# Patient Record
Sex: MALE | Race: WHITE
[De-identification: names, ages, dates, MRNs, and addresses within clinical notes are randomized per-mention and may not be internally consistent; named-entity substitution may affect disease eponyms.]

---

## 2017-12-29 ENCOUNTER — HOSPITAL ENCOUNTER (OUTPATIENT)
Dept: HOSPITAL 94 - WOUND CARE | Age: 37
Discharge: HOME | End: 2017-12-29
Attending: SURGERY
Payer: MEDICAID

## 2017-12-29 DIAGNOSIS — Z72.89: ICD-10-CM
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
## 2017-12-29 DIAGNOSIS — F17.200: ICD-10-CM

## 2017-12-29 DIAGNOSIS — T81.31XD: Primary | ICD-10-CM

## 2017-12-29 DIAGNOSIS — L98.491: ICD-10-CM

## 2017-12-29 DIAGNOSIS — E66.9: ICD-10-CM

## 2017-12-29 DIAGNOSIS — E03.9: ICD-10-CM

## 2017-12-29 PROCEDURE — 99215 OFFICE O/P EST HI 40 MIN: CPT

## 2018-01-03 ENCOUNTER — HOSPITAL ENCOUNTER (OUTPATIENT)
Dept: HOSPITAL 94 - WOUND CARE | Age: 38
Discharge: HOME | End: 2018-01-03
Attending: SURGERY
Payer: MEDICAID

## 2018-01-03 DIAGNOSIS — E03.9: ICD-10-CM

## 2018-01-03 DIAGNOSIS — Z72.89: ICD-10-CM

## 2018-01-03 DIAGNOSIS — Z89.9: ICD-10-CM

## 2018-01-03 DIAGNOSIS — T81.31XD: Primary | ICD-10-CM

## 2018-01-03 DIAGNOSIS — E66.9: ICD-10-CM

## 2018-01-03 DIAGNOSIS — L98.491: ICD-10-CM
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
## 2018-01-03 DIAGNOSIS — F17.200: ICD-10-CM

## 2018-01-03 PROCEDURE — 99211 OFF/OP EST MAY X REQ PHY/QHP: CPT

## 2018-01-05 ENCOUNTER — HOSPITAL ENCOUNTER (OUTPATIENT)
Dept: HOSPITAL 94 - WOUND CARE | Age: 38
Discharge: HOME | End: 2018-01-05
Attending: SURGERY
Payer: MEDICAID

## 2018-01-05 DIAGNOSIS — E66.9: ICD-10-CM

## 2018-01-05 DIAGNOSIS — L98.491: ICD-10-CM

## 2018-01-05 DIAGNOSIS — E03.9: ICD-10-CM
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
## 2018-01-05 DIAGNOSIS — T81.31XD: Primary | ICD-10-CM

## 2018-01-05 DIAGNOSIS — F17.200: ICD-10-CM

## 2018-01-05 DIAGNOSIS — Z72.89: ICD-10-CM

## 2018-01-05 DIAGNOSIS — Z89.9: ICD-10-CM

## 2018-01-05 PROCEDURE — 99211 OFF/OP EST MAY X REQ PHY/QHP: CPT

## 2018-01-09 ENCOUNTER — HOSPITAL ENCOUNTER (OUTPATIENT)
Dept: HOSPITAL 94 - WOUND CARE | Age: 38
Discharge: HOME | End: 2018-01-09
Attending: SURGERY
Payer: MEDICAID
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
## 2018-01-09 DIAGNOSIS — E03.9: ICD-10-CM

## 2018-01-09 DIAGNOSIS — L98.491: ICD-10-CM

## 2018-01-09 DIAGNOSIS — E66.9: ICD-10-CM

## 2018-01-09 DIAGNOSIS — T81.31XD: Primary | ICD-10-CM

## 2018-01-09 DIAGNOSIS — Z72.89: ICD-10-CM

## 2018-01-09 DIAGNOSIS — F17.200: ICD-10-CM

## 2018-01-09 DIAGNOSIS — Z89.9: ICD-10-CM

## 2018-01-09 PROCEDURE — 99215 OFFICE O/P EST HI 40 MIN: CPT

## 2018-01-12 ENCOUNTER — HOSPITAL ENCOUNTER (OUTPATIENT)
Dept: HOSPITAL 94 - WOUND CARE | Age: 38
Discharge: HOME | End: 2018-01-12
Attending: SURGERY
Payer: MEDICAID

## 2018-01-12 DIAGNOSIS — Z72.89: ICD-10-CM

## 2018-01-12 DIAGNOSIS — T81.89XD: Primary | ICD-10-CM

## 2018-01-12 DIAGNOSIS — Z89.9: ICD-10-CM

## 2018-01-12 DIAGNOSIS — E66.9: ICD-10-CM

## 2018-01-12 DIAGNOSIS — F17.200: ICD-10-CM

## 2018-01-12 DIAGNOSIS — E03.9: ICD-10-CM
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
## 2018-01-12 PROCEDURE — 99214 OFFICE O/P EST MOD 30 MIN: CPT

## 2018-01-19 ENCOUNTER — HOSPITAL ENCOUNTER (OUTPATIENT)
Dept: HOSPITAL 94 - WOUND CARE | Age: 38
Discharge: HOME | End: 2018-01-19
Attending: SURGERY
Payer: MEDICAID

## 2018-01-19 DIAGNOSIS — T81.31XD: Primary | ICD-10-CM

## 2018-01-19 DIAGNOSIS — L98.491: ICD-10-CM

## 2018-01-19 DIAGNOSIS — Z89.9: ICD-10-CM

## 2018-01-19 DIAGNOSIS — Z72.89: ICD-10-CM
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
## 2018-01-19 DIAGNOSIS — F17.200: ICD-10-CM

## 2018-01-19 DIAGNOSIS — E66.9: ICD-10-CM

## 2018-01-19 DIAGNOSIS — E03.9: ICD-10-CM

## 2018-01-19 PROCEDURE — 17250 CHEM CAUT OF GRANLTJ TISSUE: CPT

## 2018-01-26 ENCOUNTER — HOSPITAL ENCOUNTER (OUTPATIENT)
Dept: HOSPITAL 94 - WOUND CARE | Age: 38
Discharge: HOME | End: 2018-01-26
Attending: SURGERY
Payer: MEDICAID

## 2018-01-26 DIAGNOSIS — F17.200: ICD-10-CM

## 2018-01-26 DIAGNOSIS — L98.491: ICD-10-CM

## 2018-01-26 DIAGNOSIS — E66.9: ICD-10-CM

## 2018-01-26 DIAGNOSIS — E03.9: ICD-10-CM

## 2018-01-26 DIAGNOSIS — Z89.9: ICD-10-CM
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
## 2018-01-26 DIAGNOSIS — T81.31XD: Primary | ICD-10-CM

## 2018-01-26 DIAGNOSIS — Z72.89: ICD-10-CM

## 2018-01-26 PROCEDURE — 97597 DBRDMT OPN WND 1ST 20 CM/<: CPT

## 2018-02-02 ENCOUNTER — HOSPITAL ENCOUNTER (OUTPATIENT)
Dept: HOSPITAL 94 - WOUND CARE | Age: 38
Discharge: HOME | End: 2018-02-02
Attending: SURGERY
Payer: MEDICAID

## 2018-02-02 DIAGNOSIS — Z72.89: ICD-10-CM

## 2018-02-02 DIAGNOSIS — L98.491: ICD-10-CM

## 2018-02-02 DIAGNOSIS — F17.200: ICD-10-CM
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
## 2018-02-02 DIAGNOSIS — T81.31XD: Primary | ICD-10-CM

## 2018-02-02 DIAGNOSIS — E66.9: ICD-10-CM

## 2018-02-02 DIAGNOSIS — Z89.9: ICD-10-CM

## 2018-02-02 DIAGNOSIS — E03.9: ICD-10-CM

## 2018-02-02 PROCEDURE — 97597 DBRDMT OPN WND 1ST 20 CM/<: CPT

## 2018-02-09 ENCOUNTER — HOSPITAL ENCOUNTER (OUTPATIENT)
Dept: HOSPITAL 94 - WOUND CARE | Age: 38
Discharge: HOME | End: 2018-02-09
Attending: SURGERY
Payer: MEDICAID
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
## 2018-02-09 DIAGNOSIS — E03.9: ICD-10-CM

## 2018-02-09 DIAGNOSIS — F17.200: ICD-10-CM

## 2018-02-09 DIAGNOSIS — E66.9: ICD-10-CM

## 2018-02-09 DIAGNOSIS — T81.31XD: Primary | ICD-10-CM

## 2018-02-09 DIAGNOSIS — L98.491: ICD-10-CM

## 2018-02-09 DIAGNOSIS — Z72.89: ICD-10-CM

## 2018-02-09 DIAGNOSIS — Z89.9: ICD-10-CM

## 2018-02-09 PROCEDURE — 17250 CHEM CAUT OF GRANLTJ TISSUE: CPT

## 2018-02-23 ENCOUNTER — HOSPITAL ENCOUNTER (OUTPATIENT)
Dept: HOSPITAL 94 - WOUND CARE | Age: 38
Discharge: HOME | End: 2018-02-23
Attending: SURGERY
Payer: MEDICAID

## 2018-02-23 DIAGNOSIS — F17.200: ICD-10-CM

## 2018-02-23 DIAGNOSIS — Z72.89: ICD-10-CM

## 2018-02-23 DIAGNOSIS — L98.491: ICD-10-CM

## 2018-02-23 DIAGNOSIS — E66.9: ICD-10-CM

## 2018-02-23 DIAGNOSIS — Z89.9: ICD-10-CM

## 2018-02-23 DIAGNOSIS — E03.9: ICD-10-CM

## 2018-02-23 DIAGNOSIS — T81.31XD: Primary | ICD-10-CM
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
## 2018-02-23 PROCEDURE — 99214 OFFICE O/P EST MOD 30 MIN: CPT

## 2018-05-23 ENCOUNTER — HOSPITAL ENCOUNTER (OUTPATIENT)
Dept: HOSPITAL 94 - WOUND CARE | Age: 38
Discharge: HOME | End: 2018-05-23
Attending: SURGERY
Payer: MEDICAID

## 2018-05-23 DIAGNOSIS — F17.200: ICD-10-CM
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
## 2018-05-23 DIAGNOSIS — T81.31XD: Primary | ICD-10-CM

## 2018-05-23 DIAGNOSIS — E66.9: ICD-10-CM

## 2018-05-23 DIAGNOSIS — L02.211: ICD-10-CM

## 2018-05-23 DIAGNOSIS — E03.9: ICD-10-CM

## 2018-05-23 DIAGNOSIS — L98.492: ICD-10-CM

## 2018-05-23 DIAGNOSIS — Z89.9: ICD-10-CM

## 2018-05-23 PROCEDURE — 87075 CULTR BACTERIA EXCEPT BLOOD: CPT

## 2018-05-23 PROCEDURE — 87102 FUNGUS ISOLATION CULTURE: CPT

## 2018-05-23 PROCEDURE — 87070 CULTURE OTHR SPECIMN AEROBIC: CPT

## 2018-05-23 PROCEDURE — 87076 CULTURE ANAEROBE IDENT EACH: CPT

## 2018-05-23 PROCEDURE — 10061 I&D ABSCESS COMP/MULTIPLE: CPT

## 2018-05-29 ENCOUNTER — HOSPITAL ENCOUNTER (OUTPATIENT)
Dept: HOSPITAL 94 - WOUND CARE | Age: 38
Discharge: HOME | End: 2018-05-29
Attending: SURGERY
Payer: MEDICAID
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
## 2018-05-29 DIAGNOSIS — E03.9: ICD-10-CM

## 2018-05-29 DIAGNOSIS — T81.31XD: Primary | ICD-10-CM

## 2018-05-29 DIAGNOSIS — E66.9: ICD-10-CM

## 2018-05-29 DIAGNOSIS — Z89.9: ICD-10-CM

## 2018-05-29 DIAGNOSIS — L02.211: ICD-10-CM

## 2018-05-29 DIAGNOSIS — L98.492: ICD-10-CM

## 2018-05-29 DIAGNOSIS — F17.200: ICD-10-CM

## 2018-06-01 ENCOUNTER — HOSPITAL ENCOUNTER (OUTPATIENT)
Dept: HOSPITAL 94 - WOUND CARE | Age: 38
Discharge: HOME | End: 2018-06-01
Attending: SURGERY
Payer: MEDICAID

## 2018-06-01 DIAGNOSIS — L02.211: ICD-10-CM

## 2018-06-01 DIAGNOSIS — E03.9: ICD-10-CM

## 2018-06-01 DIAGNOSIS — F17.200: ICD-10-CM
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
## 2018-06-01 DIAGNOSIS — T81.89XD: Primary | ICD-10-CM

## 2018-06-01 DIAGNOSIS — E66.9: ICD-10-CM

## 2018-06-01 DIAGNOSIS — L98.492: ICD-10-CM

## 2018-06-01 DIAGNOSIS — Z89.9: ICD-10-CM

## 2018-06-01 PROCEDURE — 11042 DBRDMT SUBQ TIS 1ST 20SQCM/<: CPT

## 2018-06-05 ENCOUNTER — HOSPITAL ENCOUNTER (OUTPATIENT)
Dept: HOSPITAL 94 - WOUND CARE | Age: 38
Discharge: HOME | End: 2018-06-05
Attending: SURGERY
Payer: MEDICAID

## 2018-06-05 DIAGNOSIS — Z89.9: ICD-10-CM

## 2018-06-05 DIAGNOSIS — E66.9: ICD-10-CM

## 2018-06-05 DIAGNOSIS — L02.211: ICD-10-CM

## 2018-06-05 DIAGNOSIS — E03.9: ICD-10-CM

## 2018-06-05 DIAGNOSIS — L98.492: ICD-10-CM

## 2018-06-05 DIAGNOSIS — F17.200: ICD-10-CM

## 2018-06-05 DIAGNOSIS — T81.89XD: Primary | ICD-10-CM
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
## 2018-06-05 PROCEDURE — 97597 DBRDMT OPN WND 1ST 20 CM/<: CPT

## 2018-06-12 ENCOUNTER — HOSPITAL ENCOUNTER (OUTPATIENT)
Dept: HOSPITAL 94 - WOUND CARE | Age: 38
Discharge: HOME | End: 2018-06-12
Attending: SURGERY
Payer: MEDICAID

## 2018-06-12 DIAGNOSIS — F17.200: ICD-10-CM

## 2018-06-12 DIAGNOSIS — E66.9: ICD-10-CM

## 2018-06-12 DIAGNOSIS — Z89.9: ICD-10-CM

## 2018-06-12 DIAGNOSIS — L02.211: ICD-10-CM

## 2018-06-12 DIAGNOSIS — E03.9: ICD-10-CM
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
## 2018-06-12 DIAGNOSIS — L98.492: ICD-10-CM

## 2018-06-12 DIAGNOSIS — T81.4XXD: Primary | ICD-10-CM

## 2018-06-12 PROCEDURE — 11042 DBRDMT SUBQ TIS 1ST 20SQCM/<: CPT

## 2018-06-19 ENCOUNTER — HOSPITAL ENCOUNTER (OUTPATIENT)
Dept: HOSPITAL 94 - WOUND CARE | Age: 38
Discharge: HOME | End: 2018-06-19
Attending: SURGERY
Payer: MEDICAID

## 2018-06-19 DIAGNOSIS — L02.211: ICD-10-CM

## 2018-06-19 DIAGNOSIS — E66.9: ICD-10-CM

## 2018-06-19 DIAGNOSIS — E03.9: ICD-10-CM

## 2018-06-19 DIAGNOSIS — L98.492: ICD-10-CM

## 2018-06-19 DIAGNOSIS — T81.4XXD: Primary | ICD-10-CM

## 2018-06-19 DIAGNOSIS — F17.200: ICD-10-CM
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
## 2018-06-19 DIAGNOSIS — Z89.9: ICD-10-CM

## 2018-06-19 PROCEDURE — 97597 DBRDMT OPN WND 1ST 20 CM/<: CPT

## 2018-06-29 ENCOUNTER — HOSPITAL ENCOUNTER (OUTPATIENT)
Dept: HOSPITAL 94 - WOUND CARE | Age: 38
Discharge: HOME | End: 2018-06-29
Attending: SURGERY
Payer: MEDICAID

## 2018-06-29 DIAGNOSIS — F17.200: ICD-10-CM

## 2018-06-29 DIAGNOSIS — T81.4XXD: Primary | ICD-10-CM

## 2018-06-29 DIAGNOSIS — E66.9: ICD-10-CM
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
## 2018-06-29 DIAGNOSIS — Z89.9: ICD-10-CM

## 2018-06-29 DIAGNOSIS — L98.492: ICD-10-CM

## 2018-06-29 DIAGNOSIS — L02.211: ICD-10-CM

## 2018-06-29 DIAGNOSIS — E03.9: ICD-10-CM

## 2018-06-29 PROCEDURE — 17250 CHEM CAUT OF GRANLTJ TISSUE: CPT

## 2018-07-13 ENCOUNTER — HOSPITAL ENCOUNTER (OUTPATIENT)
Dept: HOSPITAL 94 - WOUND CARE | Age: 38
Discharge: HOME | End: 2018-07-13
Attending: SURGERY
Payer: MEDICAID

## 2018-07-13 DIAGNOSIS — E03.9: ICD-10-CM
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
## 2018-07-13 DIAGNOSIS — F17.200: ICD-10-CM

## 2018-07-13 DIAGNOSIS — Z89.9: ICD-10-CM

## 2018-07-13 DIAGNOSIS — E66.9: ICD-10-CM

## 2018-07-13 DIAGNOSIS — T81.4XXD: Primary | ICD-10-CM

## 2018-07-13 DIAGNOSIS — L98.492: ICD-10-CM

## 2018-07-13 DIAGNOSIS — L02.211: ICD-10-CM

## 2018-07-13 PROCEDURE — 11042 DBRDMT SUBQ TIS 1ST 20SQCM/<: CPT

## 2018-08-03 ENCOUNTER — HOSPITAL ENCOUNTER (OUTPATIENT)
Dept: HOSPITAL 94 - WOUND CARE | Age: 38
Discharge: HOME | End: 2018-08-03
Attending: SURGERY
Payer: MEDICAID

## 2018-08-03 VITALS — HEIGHT: 76 IN | WEIGHT: 315 LBS | BODY MASS INDEX: 38.36 KG/M2

## 2018-08-03 DIAGNOSIS — E66.9: ICD-10-CM

## 2018-08-03 DIAGNOSIS — E03.9: ICD-10-CM

## 2018-08-03 DIAGNOSIS — L98.492: ICD-10-CM
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
## 2018-08-03 DIAGNOSIS — L02.211: ICD-10-CM

## 2018-08-03 DIAGNOSIS — Z89.9: ICD-10-CM

## 2018-08-03 DIAGNOSIS — T81.89XD: Primary | ICD-10-CM

## 2018-08-03 DIAGNOSIS — F17.200: ICD-10-CM

## 2018-08-03 PROCEDURE — 99215 OFFICE O/P EST HI 40 MIN: CPT

## 2018-08-03 RX ADMIN — LIDOCAINE HYDROCHLORIDE ONE APPLIC: 20 JELLY TOPICAL at 09:51

## 2018-08-24 ENCOUNTER — HOSPITAL ENCOUNTER (OUTPATIENT)
Dept: HOSPITAL 94 - WOUND CARE | Age: 38
Discharge: HOME | End: 2018-08-24
Attending: SURGERY
Payer: MEDICAID

## 2018-08-24 DIAGNOSIS — E66.9: ICD-10-CM

## 2018-08-24 DIAGNOSIS — L98.492: ICD-10-CM

## 2018-08-24 DIAGNOSIS — T81.4XXD: Primary | ICD-10-CM
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
## 2018-08-24 DIAGNOSIS — E03.9: ICD-10-CM

## 2018-08-24 DIAGNOSIS — Z89.9: ICD-10-CM

## 2018-08-24 DIAGNOSIS — F17.200: ICD-10-CM

## 2018-08-24 DIAGNOSIS — L02.211: ICD-10-CM

## 2018-08-24 PROCEDURE — 97597 DBRDMT OPN WND 1ST 20 CM/<: CPT

## 2018-09-14 ENCOUNTER — HOSPITAL ENCOUNTER (OUTPATIENT)
Dept: HOSPITAL 94 - WOUND CARE | Age: 38
Discharge: HOME | End: 2018-09-14
Attending: SURGERY
Payer: MEDICAID

## 2018-09-14 DIAGNOSIS — L02.211: ICD-10-CM

## 2018-09-14 DIAGNOSIS — E66.9: ICD-10-CM

## 2018-09-14 DIAGNOSIS — E03.9: ICD-10-CM

## 2018-09-14 DIAGNOSIS — Z89.9: ICD-10-CM

## 2018-09-14 DIAGNOSIS — L98.492: ICD-10-CM
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
## 2018-09-14 DIAGNOSIS — F17.200: ICD-10-CM

## 2018-09-14 DIAGNOSIS — T81.4XXD: Primary | ICD-10-CM

## 2018-09-14 PROCEDURE — 97597 DBRDMT OPN WND 1ST 20 CM/<: CPT

## 2018-10-05 ENCOUNTER — HOSPITAL ENCOUNTER (OUTPATIENT)
Dept: HOSPITAL 94 - WOUND CARE | Age: 38
Discharge: HOME | End: 2018-10-05
Attending: SURGERY
Payer: MEDICAID

## 2018-10-05 DIAGNOSIS — L98.492: ICD-10-CM

## 2018-10-05 DIAGNOSIS — F17.200: ICD-10-CM

## 2018-10-05 DIAGNOSIS — Z89.9: ICD-10-CM

## 2018-10-05 DIAGNOSIS — E03.9: ICD-10-CM

## 2018-10-05 DIAGNOSIS — E66.9: ICD-10-CM

## 2018-10-05 DIAGNOSIS — T81.89XD: Primary | ICD-10-CM
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
## 2018-10-05 DIAGNOSIS — L02.211: ICD-10-CM

## 2018-10-05 PROCEDURE — 99215 OFFICE O/P EST HI 40 MIN: CPT

## 2018-10-09 ENCOUNTER — HOSPITAL ENCOUNTER (OUTPATIENT)
Dept: HOSPITAL 94 - WOUND CARE | Age: 38
Discharge: HOME | End: 2018-10-09
Attending: SURGERY
Payer: MEDICAID

## 2018-10-09 DIAGNOSIS — E03.9: ICD-10-CM

## 2018-10-09 DIAGNOSIS — T81.89XD: Primary | ICD-10-CM

## 2018-10-09 DIAGNOSIS — L02.211: ICD-10-CM

## 2018-10-09 DIAGNOSIS — Z89.9: ICD-10-CM
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
## 2018-10-09 DIAGNOSIS — F17.200: ICD-10-CM

## 2018-10-09 DIAGNOSIS — L98.492: ICD-10-CM

## 2018-10-09 DIAGNOSIS — E66.9: ICD-10-CM

## 2018-10-09 PROCEDURE — 99215 OFFICE O/P EST HI 40 MIN: CPT

## 2018-10-12 ENCOUNTER — HOSPITAL ENCOUNTER (OUTPATIENT)
Dept: HOSPITAL 94 - WOUND CARE | Age: 38
Discharge: HOME | End: 2018-10-12
Attending: SURGERY
Payer: MEDICAID

## 2018-10-12 DIAGNOSIS — L02.211: ICD-10-CM

## 2018-10-12 DIAGNOSIS — E03.9: ICD-10-CM

## 2018-10-12 DIAGNOSIS — Z89.9: ICD-10-CM

## 2018-10-12 DIAGNOSIS — E66.9: ICD-10-CM

## 2018-10-12 DIAGNOSIS — F17.200: ICD-10-CM

## 2018-10-12 DIAGNOSIS — L98.492: ICD-10-CM

## 2018-10-12 DIAGNOSIS — T81.89XD: Primary | ICD-10-CM
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
## 2018-10-12 PROCEDURE — 97597 DBRDMT OPN WND 1ST 20 CM/<: CPT

## 2018-10-15 ENCOUNTER — HOSPITAL ENCOUNTER (OUTPATIENT)
Dept: HOSPITAL 94 - WOUND CARE | Age: 38
Discharge: HOME | End: 2018-10-15
Attending: SURGERY
Payer: MEDICAID

## 2018-10-15 DIAGNOSIS — E66.9: ICD-10-CM
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
## 2018-10-15 DIAGNOSIS — L02.211: ICD-10-CM

## 2018-10-15 DIAGNOSIS — F17.200: ICD-10-CM

## 2018-10-15 DIAGNOSIS — Z89.9: ICD-10-CM

## 2018-10-15 DIAGNOSIS — T81.89XD: Primary | ICD-10-CM

## 2018-10-15 DIAGNOSIS — L98.492: ICD-10-CM

## 2018-10-15 DIAGNOSIS — E03.9: ICD-10-CM

## 2018-10-15 PROCEDURE — 97597 DBRDMT OPN WND 1ST 20 CM/<: CPT

## 2018-10-15 PROCEDURE — 87077 CULTURE AEROBIC IDENTIFY: CPT

## 2018-10-15 PROCEDURE — 87075 CULTR BACTERIA EXCEPT BLOOD: CPT

## 2018-10-15 PROCEDURE — 87186 SC STD MICRODIL/AGAR DIL: CPT

## 2018-10-15 PROCEDURE — 87070 CULTURE OTHR SPECIMN AEROBIC: CPT

## 2018-10-15 PROCEDURE — 87102 FUNGUS ISOLATION CULTURE: CPT

## 2018-10-18 ENCOUNTER — HOSPITAL ENCOUNTER (OUTPATIENT)
Dept: HOSPITAL 94 - WOUND CARE | Age: 38
Discharge: HOME | End: 2018-10-18
Attending: SURGERY
Payer: MEDICAID

## 2018-10-18 DIAGNOSIS — E66.9: ICD-10-CM

## 2018-10-18 DIAGNOSIS — L02.211: ICD-10-CM

## 2018-10-18 DIAGNOSIS — E03.9: ICD-10-CM

## 2018-10-18 DIAGNOSIS — F17.200: ICD-10-CM

## 2018-10-18 DIAGNOSIS — Z89.9: ICD-10-CM

## 2018-10-18 DIAGNOSIS — L98.492: ICD-10-CM
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
## 2018-10-18 DIAGNOSIS — T81.89XD: Primary | ICD-10-CM

## 2018-10-18 PROCEDURE — 11042 DBRDMT SUBQ TIS 1ST 20SQCM/<: CPT

## 2018-10-22 ENCOUNTER — HOSPITAL ENCOUNTER (OUTPATIENT)
Dept: HOSPITAL 94 - WOUND CARE | Age: 38
Discharge: HOME | End: 2018-10-22
Attending: SURGERY
Payer: MEDICAID

## 2018-10-22 DIAGNOSIS — L98.492: ICD-10-CM

## 2018-10-22 DIAGNOSIS — Z89.9: ICD-10-CM
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
## 2018-10-22 DIAGNOSIS — E66.9: ICD-10-CM

## 2018-10-22 DIAGNOSIS — F17.200: ICD-10-CM

## 2018-10-22 DIAGNOSIS — T81.89XD: Primary | ICD-10-CM

## 2018-10-22 DIAGNOSIS — E03.9: ICD-10-CM

## 2018-10-22 DIAGNOSIS — L02.211: ICD-10-CM

## 2018-10-22 PROCEDURE — 11042 DBRDMT SUBQ TIS 1ST 20SQCM/<: CPT

## 2018-10-26 ENCOUNTER — HOSPITAL ENCOUNTER (OUTPATIENT)
Dept: HOSPITAL 94 - WOUND CARE | Age: 38
Discharge: HOME | End: 2018-10-26
Attending: SURGERY
Payer: MEDICAID

## 2018-10-26 DIAGNOSIS — F17.200: ICD-10-CM

## 2018-10-26 DIAGNOSIS — Z89.9: ICD-10-CM

## 2018-10-26 DIAGNOSIS — E03.9: ICD-10-CM

## 2018-10-26 DIAGNOSIS — L02.211: ICD-10-CM
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
## 2018-10-26 DIAGNOSIS — L98.492: ICD-10-CM

## 2018-10-26 DIAGNOSIS — E66.9: ICD-10-CM

## 2018-10-26 DIAGNOSIS — T81.89XD: Primary | ICD-10-CM

## 2018-10-26 PROCEDURE — 11042 DBRDMT SUBQ TIS 1ST 20SQCM/<: CPT

## 2018-10-29 ENCOUNTER — HOSPITAL ENCOUNTER (OUTPATIENT)
Dept: HOSPITAL 94 - WOUND CARE | Age: 38
Discharge: HOME | End: 2018-10-29
Attending: SURGERY
Payer: MEDICAID

## 2018-10-29 DIAGNOSIS — Z89.9: ICD-10-CM

## 2018-10-29 DIAGNOSIS — T81.89XD: Primary | ICD-10-CM

## 2018-10-29 DIAGNOSIS — L98.492: ICD-10-CM

## 2018-10-29 DIAGNOSIS — E66.9: ICD-10-CM

## 2018-10-29 DIAGNOSIS — E03.9: ICD-10-CM

## 2018-10-29 DIAGNOSIS — F17.200: ICD-10-CM
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
## 2018-10-29 DIAGNOSIS — L02.211: ICD-10-CM

## 2018-10-29 PROCEDURE — 11042 DBRDMT SUBQ TIS 1ST 20SQCM/<: CPT

## 2018-11-01 ENCOUNTER — HOSPITAL ENCOUNTER (OUTPATIENT)
Dept: HOSPITAL 94 - WOUND CARE | Age: 38
Discharge: HOME | End: 2018-11-01
Attending: SURGERY
Payer: MEDICAID

## 2018-11-01 DIAGNOSIS — T81.89XD: Primary | ICD-10-CM

## 2018-11-01 DIAGNOSIS — E03.9: ICD-10-CM
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
## 2018-11-01 DIAGNOSIS — Z89.9: ICD-10-CM

## 2018-11-01 DIAGNOSIS — F17.200: ICD-10-CM

## 2018-11-01 DIAGNOSIS — L02.211: ICD-10-CM

## 2018-11-01 DIAGNOSIS — E66.9: ICD-10-CM

## 2018-11-01 DIAGNOSIS — L98.492: ICD-10-CM

## 2018-11-01 PROCEDURE — 99211 OFF/OP EST MAY X REQ PHY/QHP: CPT

## 2018-11-05 ENCOUNTER — HOSPITAL ENCOUNTER (OUTPATIENT)
Dept: HOSPITAL 94 - WOUND CARE | Age: 38
Discharge: HOME | End: 2018-11-05
Attending: SURGERY
Payer: MEDICAID

## 2018-11-05 DIAGNOSIS — T81.89XD: Primary | ICD-10-CM
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
## 2018-11-05 DIAGNOSIS — Z89.9: ICD-10-CM

## 2018-11-05 DIAGNOSIS — E66.9: ICD-10-CM

## 2018-11-05 DIAGNOSIS — L02.211: ICD-10-CM

## 2018-11-05 DIAGNOSIS — L98.492: ICD-10-CM

## 2018-11-05 DIAGNOSIS — E03.9: ICD-10-CM

## 2018-11-05 DIAGNOSIS — F17.200: ICD-10-CM

## 2018-11-05 PROCEDURE — 17250 CHEM CAUT OF GRANLTJ TISSUE: CPT

## 2018-11-08 ENCOUNTER — HOSPITAL ENCOUNTER (OUTPATIENT)
Dept: HOSPITAL 94 - WOUND CARE | Age: 38
Discharge: HOME | End: 2018-11-08
Attending: SURGERY
Payer: MEDICAID

## 2018-11-08 DIAGNOSIS — E03.9: ICD-10-CM

## 2018-11-08 DIAGNOSIS — T81.89XD: Primary | ICD-10-CM

## 2018-11-08 DIAGNOSIS — L98.492: ICD-10-CM

## 2018-11-08 DIAGNOSIS — Z89.9: ICD-10-CM

## 2018-11-08 DIAGNOSIS — F17.200: ICD-10-CM
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
## 2018-11-08 DIAGNOSIS — E66.9: ICD-10-CM

## 2018-11-08 DIAGNOSIS — L02.211: ICD-10-CM

## 2018-11-08 PROCEDURE — 99211 OFF/OP EST MAY X REQ PHY/QHP: CPT

## 2018-11-15 ENCOUNTER — HOSPITAL ENCOUNTER (OUTPATIENT)
Dept: HOSPITAL 94 - WOUND CARE | Age: 38
Discharge: HOME | End: 2018-11-15
Attending: SURGERY
Payer: MEDICAID

## 2018-11-15 DIAGNOSIS — E66.9: ICD-10-CM

## 2018-11-15 DIAGNOSIS — L02.211: ICD-10-CM

## 2018-11-15 DIAGNOSIS — T81.89XD: Primary | ICD-10-CM

## 2018-11-15 DIAGNOSIS — E03.9: ICD-10-CM

## 2018-11-15 DIAGNOSIS — F17.200: ICD-10-CM
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
## 2018-11-15 DIAGNOSIS — L98.492: ICD-10-CM

## 2018-11-15 DIAGNOSIS — Z89.9: ICD-10-CM

## 2018-11-15 PROCEDURE — 17250 CHEM CAUT OF GRANLTJ TISSUE: CPT

## 2018-11-15 PROCEDURE — 97597 DBRDMT OPN WND 1ST 20 CM/<: CPT

## 2018-11-29 ENCOUNTER — HOSPITAL ENCOUNTER (OUTPATIENT)
Dept: HOSPITAL 94 - WOUND CARE | Age: 38
Discharge: HOME | End: 2018-11-29
Attending: SURGERY
Payer: MEDICAID

## 2018-11-29 DIAGNOSIS — T81.89XD: Primary | ICD-10-CM

## 2018-11-29 DIAGNOSIS — Z89.9: ICD-10-CM

## 2018-11-29 DIAGNOSIS — F17.200: ICD-10-CM
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
## 2018-11-29 DIAGNOSIS — E03.9: ICD-10-CM

## 2018-11-29 DIAGNOSIS — E66.9: ICD-10-CM

## 2018-11-29 DIAGNOSIS — L02.211: ICD-10-CM

## 2018-11-29 DIAGNOSIS — L98.492: ICD-10-CM

## 2018-11-29 PROCEDURE — 17250 CHEM CAUT OF GRANLTJ TISSUE: CPT

## 2018-11-29 PROCEDURE — 97597 DBRDMT OPN WND 1ST 20 CM/<: CPT

## 2018-12-06 ENCOUNTER — HOSPITAL ENCOUNTER (OUTPATIENT)
Dept: HOSPITAL 94 - WOUND CARE | Age: 38
Discharge: HOME | End: 2018-12-06
Attending: SURGERY
Payer: MEDICAID

## 2018-12-06 DIAGNOSIS — T81.89XD: Primary | ICD-10-CM

## 2018-12-06 DIAGNOSIS — L98.492: ICD-10-CM

## 2018-12-06 DIAGNOSIS — F17.200: ICD-10-CM

## 2018-12-06 DIAGNOSIS — E66.9: ICD-10-CM

## 2018-12-06 DIAGNOSIS — L02.211: ICD-10-CM

## 2018-12-06 DIAGNOSIS — E03.9: ICD-10-CM
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
## 2018-12-06 DIAGNOSIS — Z89.9: ICD-10-CM

## 2018-12-06 PROCEDURE — 17250 CHEM CAUT OF GRANLTJ TISSUE: CPT

## 2018-12-20 ENCOUNTER — HOSPITAL ENCOUNTER (OUTPATIENT)
Dept: HOSPITAL 94 - WOUND CARE | Age: 38
Discharge: HOME | End: 2018-12-20
Attending: SURGERY
Payer: MEDICAID

## 2018-12-20 DIAGNOSIS — E03.9: ICD-10-CM

## 2018-12-20 DIAGNOSIS — Z89.9: ICD-10-CM

## 2018-12-20 DIAGNOSIS — F17.200: ICD-10-CM

## 2018-12-20 DIAGNOSIS — T81.89XD: Primary | ICD-10-CM

## 2018-12-20 DIAGNOSIS — L02.211: ICD-10-CM

## 2018-12-20 DIAGNOSIS — E66.9: ICD-10-CM
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
## 2018-12-20 DIAGNOSIS — L98.492: ICD-10-CM

## 2018-12-20 PROCEDURE — 97597 DBRDMT OPN WND 1ST 20 CM/<: CPT

## 2019-01-10 ENCOUNTER — HOSPITAL ENCOUNTER (OUTPATIENT)
Dept: HOSPITAL 94 - WOUND CARE | Age: 39
Discharge: HOME | End: 2019-01-10
Attending: SURGERY
Payer: MEDICAID

## 2019-01-10 DIAGNOSIS — F17.200: ICD-10-CM

## 2019-01-10 DIAGNOSIS — E03.9: ICD-10-CM

## 2019-01-10 DIAGNOSIS — T81.89XD: Primary | ICD-10-CM

## 2019-01-10 DIAGNOSIS — Z89.9: ICD-10-CM

## 2019-01-10 DIAGNOSIS — E66.9: ICD-10-CM

## 2019-01-10 DIAGNOSIS — F10.10: ICD-10-CM
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
## 2019-01-10 DIAGNOSIS — L02.211: ICD-10-CM

## 2019-01-10 DIAGNOSIS — L98.492: ICD-10-CM

## 2019-01-10 NOTE — NUR
Patient ambulated independently from [facility] and was admitted to outpatient wound care for 
physician visit with Killian Bond MD.  No dressing, no open area remaining to abdominal wound. 
Patient assessed for changes in conditions, medications and medical history. 



1018 - Dr. Bond at bedside accompanied by RN.  Wound assessed by MD and is declared healed. 
 Plan of care discussed with patient.  No dressing ordered or placed.  Patient is discharged 
from the wound clinic to follow up on an as needed basis.



Patient instructed on the signs and symptoms of infection and to call the Wound Center if 
any occur or to go to the ED if we are closed:

 Increased pain in wound

 Increase in drainage from the wound

 Redness in the skin surrounding the wound

 Bleeding from the wound

 Temperature of 101 or greater



Patient instructed that the weight of their body puts a large amount of pressure on their 
wounds. This pressure keeps the new tissue from growing and inhibits new blood vessels from 
forming. Explained that, if they continue to bear weight on a body part that has a wound, 
the time it takes to heal the wound increases, the wound may get worse or the wound may not 
heal at all.



Patient verbalized understanding of all discharge instructions and plan of care and 
ambulated independently out to [facility] in stable condition with no sign or symptom of distress 
at time of discharge.